# Patient Record
Sex: MALE | Race: WHITE | NOT HISPANIC OR LATINO | URBAN - METROPOLITAN AREA
[De-identification: names, ages, dates, MRNs, and addresses within clinical notes are randomized per-mention and may not be internally consistent; named-entity substitution may affect disease eponyms.]

---

## 2017-05-08 ENCOUNTER — IMPORTED ENCOUNTER (OUTPATIENT)
Dept: URBAN - METROPOLITAN AREA CLINIC 50 | Facility: CLINIC | Age: 82
End: 2017-05-08

## 2017-05-15 ENCOUNTER — IMPORTED ENCOUNTER (OUTPATIENT)
Dept: URBAN - METROPOLITAN AREA CLINIC 50 | Facility: CLINIC | Age: 82
End: 2017-05-15

## 2017-11-13 ENCOUNTER — IMPORTED ENCOUNTER (OUTPATIENT)
Dept: URBAN - METROPOLITAN AREA CLINIC 50 | Facility: CLINIC | Age: 82
End: 2017-11-13

## 2018-04-30 ENCOUNTER — IMPORTED ENCOUNTER (OUTPATIENT)
Dept: URBAN - METROPOLITAN AREA CLINIC 50 | Facility: CLINIC | Age: 83
End: 2018-04-30

## 2018-06-04 ENCOUNTER — IMPORTED ENCOUNTER (OUTPATIENT)
Dept: URBAN - METROPOLITAN AREA CLINIC 50 | Facility: CLINIC | Age: 83
End: 2018-06-04

## 2018-12-31 ENCOUNTER — IMPORTED ENCOUNTER (OUTPATIENT)
Dept: URBAN - METROPOLITAN AREA CLINIC 50 | Facility: CLINIC | Age: 83
End: 2018-12-31

## 2019-01-08 ENCOUNTER — IMPORTED ENCOUNTER (OUTPATIENT)
Dept: URBAN - METROPOLITAN AREA CLINIC 50 | Facility: CLINIC | Age: 84
End: 2019-01-08

## 2019-01-25 NOTE — PATIENT DISCUSSION
Very, very early dry AMD. AREDS 2 not recommended at this time. Will continue to watch. Advised to call with any vision changes.

## 2019-01-25 NOTE — PATIENT DISCUSSION
Pt edu as an aging change the vitreous starts to liquefy and pull away from the retina, usually it pulls away with out a problem. But sometimes the vitreous can stick to the retina which causes the macula to stretch and form a hole. Edu pt only treatment is with surgery to close the whole. Without surgery hole will not close. R & B of PPv (25g) MP w/Hesham, ILM rem with ICG, AFGx discussed in detail. Edu face down x 3 days after surgery, at least 45/15. Discussed, eye shield and vision always worse following surgery due to gas bubble. Stressed no flying until bubble 100% gone, most VA improvement is with in the first 2 months after the bubble dissipates but can take 6m-1y to reach full VA potential. May always have some distortion in central vision after surgery. Pt elects to proceed. Denies any recent cardiac procedures in the past 6 months. Informational brochures given to patient along with [employer]. Pt states she had neck surgery about 6 months ago, informed pt the hole will have a greater potential for closure if face down. STRESSED no flat on back.

## 2019-01-25 NOTE — PATIENT DISCUSSION
Watching vs surgery discussed. Recommend watching for now. VA is to good at this point. Could spontaneously resolve on own. We reviewed the signs and symptoms of retinal tear/retinal detachment and the importance of prompt evaluation should there be increasing floaters, new flashing lights, or decreasing peripheral vision in either eye at any time.

## 2019-02-07 NOTE — PATIENT DISCUSSION
Post-op instructions given. Discussed drops, positioning (facedown for 3 more nights) NO FLAT ON BACK, no strenuous activity and eye protection. No lifting over 40 lbs.  No swimming for 1 week.  No makeup for 1 week.  Call immediately if eye pain or loss of vision.  Green bracelet put on.  No flying with gas bubble.  Green bracelet will be removed once gas bubble is gone.

## 2019-02-14 NOTE — PATIENT DISCUSSION
Edu patient regarding progression of Cataracts following PPv. Will discuss cataract surgery at next visit.

## 2019-02-14 NOTE — PATIENT DISCUSSION
Post-op instructions given. Discussed drops, positioning, still no flat on back until bubble 100% gone. Continue to wear green bracelet until bubble 100% gone. Hold off on any dental procedure for now, ok to see OBGYN. Pt would like to go to a conference in [address] in April, will just need a bubble check prior to flying.

## 2019-03-05 NOTE — PATIENT DISCUSSION
Doing well, retina attached, good IOP with no signs of endophthalmitis. Bubble gone, [facility] 6m-1yr to reach full VA potential.  All restrictions lifted.

## 2019-05-24 NOTE — PATIENT DISCUSSION
Cataract is progressing following PPv, which was discussed prior to surgery. Recommend next available cataract consult with WERICL. Will need to be seen by JESTEVAN 1 month following cataract surgery.

## 2019-05-24 NOTE — PATIENT DISCUSSION
Doing well, retina attached, good IOP with no signs of endophthalmitis. Pt edu 6m-1yr to reach full VA potential and even then may still have some distortion. Healing well.

## 2019-06-04 NOTE — PATIENT DISCUSSION
**7/31/19 [facility] approved pt for cataract surgery. WJL reviewed JTM's exam notes and WJL's last exam. Pt is approved for [facility] OD. I called pt to notify her. Emailed Leslee to call pt. seymour.

## 2019-06-24 ENCOUNTER — IMPORTED ENCOUNTER (OUTPATIENT)
Dept: URBAN - METROPOLITAN AREA CLINIC 50 | Facility: CLINIC | Age: 84
End: 2019-06-24

## 2019-07-23 NOTE — PATIENT DISCUSSION
Stable from last visit.  Could spontaneously resolve on own. Cataract surgery could help pull the remaining off.  We reviewed the signs and symptoms of retinal tear/retinal detachment and the importance of prompt evaluation should there be increasing floaters, new flashing lights, or decreasing peripheral vision in either eye at any time.

## 2019-07-23 NOTE — PATIENT DISCUSSION
Doing well, retina attached, HOLE CLOSED.  Pt edu 6m-1yr to reach full VA potential and even then may still have some distortion.

## 2019-07-23 NOTE — PATIENT DISCUSSION
Stable from last visit.  Could spontaneously resolve on its own. Cataract surgery could help pull the remaining off. Will watch for now.  We reviewed the signs and symptoms of retinal tear/retinal detachment and the importance of prompt evaluation should there be increasing floaters, new flashing lights, or decreasing peripheral vision in either eye at any time.

## 2019-08-21 NOTE — PATIENT DISCUSSION
ok to proceed with IOL OD due to [address] dec for Sx OD made today with KMS Goal: CV ALTAGRACIA OD.  pt knows will NVO for inside arm's length.

## 2019-09-11 NOTE — PATIENT DISCUSSION
Recommend Mini lower lift, Deep plane lift, no platysmaplasty, post-tragel, SMAS to Cheeks(discussed risks and benefits of sx. ..). Full face laser not recommended.

## 2019-09-13 NOTE — PATIENT DISCUSSION
"see J next week for [facility] pt says just happened and ""popped"" this past Tuesday. "

## 2019-09-17 NOTE — PATIENT DISCUSSION
Pt edu like OS, OD now has a MH. Only treatment is surgery. Edu still very early [address] and still some traction. Would recommend PPv (25g) MP w/Hesham, ILM rem w/ICG, AFGx (20% SF6), R & B discussed in detail. Pt elects to proceed. Stressed would need to remain face down x3 days after surgery for best results. [employer] given to patient to order chair. Pt denies any recent cardiac procedures in the past 6 months. Stressed most vision improvement within the first 2 months but can take 6m-1yr to reach full VA potential and may even still have some distortion following successful surgery.

## 2019-09-24 NOTE — PATIENT DISCUSSION
Patient informed of available non-opioid medications and other non-pharmacological options. Discussed advantages and disadvantages of the alternatives, including whether the patient is at-risk for, or has a history of, controlled substance abuse or misuse, and the patient’s personal preferences. [facility] “Opioid Alternative Pamphlet” was provided to patient.

## 2019-09-24 NOTE — PATIENT DISCUSSION
Recommend Mini lower lift, Deep plane lift, no platysmaplasty, post-tragel, SMAS to Cheeks(discussed risks and benefits of sx. ..). Add medium Chin Implant. No laser.

## 2019-09-25 NOTE — PATIENT DISCUSSION
Explained that poor vision is expected until the gas fill recedes beyond the intermediate point. Most vision improvement within the first 2 months but can take 6m-1yr to reach full vision potential. Advised pt to call with any vision changes, 24/7 ER reviewed.

## 2019-09-25 NOTE — PATIENT DISCUSSION
Post-op instructions given. Discussed drops, positioning, no strenuous activity and eye protection. Call immediately if eye pain or loss of vision. Stressed to patient 2 more days of face down position then any position but NO FLAT on back. Do not recommend make up for at least 1 week and recommend using a new mascara. Ok to return to work on Monday.

## 2019-09-26 NOTE — PATIENT DISCUSSION
Explained that poor vision is expected until the gas fill recedes beyond the half-way point. Most vision improvement within the first 2 months but can take 6m-1yr to reach full vision potential. Advised pt to call with any vision changes, 24/7 ER reviewed.

## 2019-10-03 NOTE — PATIENT DISCUSSION
Explained that poor vision is expected until the gas fill recedes beyond the detention point. Most vision improvement within the first 2 months but can take 6m-1yr to reach full vision potential. Advised pt to call with any vision changes, 24/7 ER reviewed.

## 2019-10-03 NOTE — PATIENT DISCUSSION
Post-op instructions given. Discussed drops, positioning, no strenuous activity and eye protection. Call immediately if eye pain or loss of vision. Any position ok but still no FLAT ON BACK. Explained to patient that the floaters she is seeing is the gas bubble breaking up. Still no flying until bubble 100% gone. Do not recommend anything that jars the head or heavy lifting. Will clear patient for procedure with Dr. Antione Beltran at next visit, bubble needs to be gone before proceeding.

## 2019-10-17 NOTE — PATIENT DISCUSSION
Most vision improvement within the first 2 months but can take 6m-1yr to reach full vision potential. Advised pt to call with any vision changes, 24/7 ER reviewed.

## 2019-10-17 NOTE — PATIENT DISCUSSION
Post-op instructions given. Discussed drops, positioning, no strenuous activity and eye protection. Call immediately if eye pain or loss of vision. Bubble is gone so pt cleared for surgery with JPF.

## 2019-10-21 NOTE — PATIENT DISCUSSION
Patient informed of available non-opioid medications and other non-pharmacological options. Discussed advantages and disadvantages of the alternatives, including whether the patient is at-risk for, or has a history of, controlled substance abuse or misuse, and the patient’s personal preferences. [address] [facility] “Opioid Alternative Pamphlet” was provided to patient.

## 2019-10-21 NOTE — PATIENT DISCUSSION
Patient informed of available non-opioid medications and other non-pharmacological options. Discussed advantages and disadvantages of the alternatives, including whether the patient is at-risk for, or has a history of, controlled substance abuse or misuse, and the patient’s personal preferences. [facility] “Opioid Alternative Pamphlet” was provided to patient.

## 2019-10-21 NOTE — PROCEDURE NOTE: SURGICAL
MR #:&nbsp; [id number]<LY />  <br />  PREOPERATIVE DIAGNOSIS:&nbsp; 1. Lower lid fat herniation and excess skin<br />  <br />  POSTOPERATIVE DIAGNOSIS: &nbsp; &nbsp;Same <br />  <br />  PROCEDURE: &nbsp; 1. C02 Laser transconjunctival blepharoplasty lower lids; 2. Skin resurfacing of lower lids<br />  <br />  ANESTHESIA: &nbsp; &nbsp; Local MAC<br />  <br />  ESTIMATED BLOOD LOSS: &nbsp; &nbsp;Minimal, &lt;5 cc <br />  <br />  COMPLICATIONS: &nbsp; &nbsp;None<br />  <br />  INDICATION:&nbsp; This patient has large herniated fat on the lower lids and redundant skin of the lower lids. We decided to perform a lower blepharoplasty operation to remove lower lid fat bags and to lightly resurface the skin to tighten them up. Patient understands the risks and benefits of the surgery and wishes to proceed. <br />  <br />  <br />  PROCEDURE: &nbsp; The patient was brought to the operating room and laid in the supine position. Prior to surgery, a time-out was performed in the operating room, and the nurse confirmed the patient’s identity and name, the side and site of the surgery and the type of surgery and procedure to be performed. I proceeded with the marking of the patient with a blue marking pen, in the areas of surgery to be performed. The patient was then sedated and injected with Xylocaine 2% with epinephrine. &nbsp; Approximately 6ccs was used for both lids. &nbsp; A drop of Alcaine was placed over both eyes. The patient was then prepped and draped in typical fashion for facial plastic surgery. Laser safe protective corneal lenses were then placed over both eyes. Attention was turned to the lower blepharoplasty operation where a rake was then inserted into the right lower lid, retracting it downward. A transconjunctival incision was made 2mm below the tarsus with the Bovie cautery. This incision was carried across the extent of the inner lid. Dissection was carried down with the Bovie cautery while pulling upward on the conjunctiva and retractor layer with a forceps. The fat was released from the septum and allowed to herniate forward exposing the nasal, central and temporal fat pads. Care was taken to avoid the inferior oblique muscle. These pads were teased forward and isolated. All bleeders were cauterized with Bovie cautery. The fat was removed selectively with Bi-polar scissors. Hemostasis was further achieved with a Bovie cautery. After judging adequate fat removal, the left lower lid had similar procedure. The skin was then resurfaced using the resurfacing CO2 laser on a low setting of 16 serrano scan mode for an initial pass on both lower lids. This was done to tighten up the skin to remove wrinkles. This patient did have lid laxity so the laser was not aggressively performed on this patient in order to avoid ectropion. A second pass on the malar area was performed with the C02 laser set again at 16 serrano. This was done to tighten up the malar area so fluid bags would not collect on the cheeks of this patient. At the end of this procedure the laser lens were removed. The patient was cleaned; antibiotic salve was placed on the wounds. The patient was sent to recovery room in stable condition. <br />

## 2019-10-22 NOTE — PATIENT DISCUSSION
Patient informed of available non-opioid medications and other non-pharmacological options. Discussed advantages and disadvantages of the alternatives, including whether the patient is at-risk for, or has a history of, controlled substance abuse or misuse, and the patient’s personal preferences. [facility] “Opioid Alternative Pamphlet” was provided to patient.

## 2019-10-22 NOTE — PATIENT DISCUSSION
One day post op: lids in good position, healing well, no infection, use erythromycin TID to lower eyelids, use Tobradex TID , use cold packs, sleep at a 30 degree angle. Wear sunglasses and hat while outdoors. Avoid sun exposure.

## 2019-10-28 NOTE — PATIENT DISCUSSION
Patient informed of available non-opioid medications and other non-pharmacological options. Discussed advantages and disadvantages of the alternatives, including whether the patient is at-risk for, or has a history of, controlled substance abuse or misuse, and the patient’s personal preferences. [facility] “Opioid Alternative Pamphlet” was provided to patient.

## 2019-11-04 NOTE — PATIENT DISCUSSION
Patient informed of available non-opioid medications and other non-pharmacological options. Discussed advantages and disadvantages of the alternatives, including whether the patient is at-risk for, or has a history of, controlled substance abuse or misuse, and the patient’s personal preferences. [facility] “Opioid Alternative Pamphlet” was provided to patient.

## 2019-11-04 NOTE — PATIENT DISCUSSION
Two week post op: great curve and shape, no infection, healing well, sutures intact and removed, use erythromycin QHS to upper eyelids x 7-10 days. Use Vitamin E oil/Skinuva QHS x 1 month to incisions after 10 days. Wear sunglasses and hat while outdoors. Avoid sun exposure. No restrictions in 5 days.

## 2019-11-05 NOTE — PATIENT DISCUSSION
Some PO swelling, normal following surgery. Recommend starting BromSite 1 gtt Qday until gone, sample given. Will follow up in 1 month.

## 2019-11-18 NOTE — PATIENT DISCUSSION
Patient informed of available non-opioid medications and other non-pharmacological options. Discussed advantages and disadvantages of the alternatives, including whether the patient is at-risk for, or has a history of, controlled substance abuse or misuse, and the patient’s personal preferences. [address] [facility] “Opioid Alternative Pamphlet” was provided to patient.

## 2019-11-18 NOTE — PATIENT DISCUSSION
Four week post op: great curve and shape, no infection, healing well,  Use Skinuva QHS x 1 month. Wear sunglasses and hat while outdoors. Avoid sun exposure.

## 2019-12-03 ENCOUNTER — IMPORTED ENCOUNTER (OUTPATIENT)
Dept: URBAN - METROPOLITAN AREA CLINIC 50 | Facility: CLINIC | Age: 84
End: 2019-12-03

## 2019-12-09 ENCOUNTER — IMPORTED ENCOUNTER (OUTPATIENT)
Dept: URBAN - METROPOLITAN AREA CLINIC 50 | Facility: CLINIC | Age: 84
End: 2019-12-09

## 2020-06-15 ENCOUNTER — IMPORTED ENCOUNTER (OUTPATIENT)
Dept: URBAN - METROPOLITAN AREA CLINIC 50 | Facility: CLINIC | Age: 85
End: 2020-06-15

## 2021-01-12 NOTE — PATIENT DISCUSSION
Hole closed. I will START or STAY ON the medications listed below when I get home from the hospital:    aspirin 81 mg oral tablet, chewable  -- 1 tab(s) by mouth once a day  -- Indication: For -    fosinopril 10 mg oral tablet  -- 1 tab(s) by mouth once a day  -- Indication: For -    metFORMIN 500 mg oral tablet, extended release  -- 2 tab(s) by mouth 2 times a day  -- Indication: For -    Jardiance 25 mg oral tablet  -- 1 tab(s) by mouth once a day (in the morning)  -- Indication: For -    Trulicity Pen 1.5 mg/0.5 mL subcutaneous solution  -- 0.5 milliliter(s) subcutaneous once a week      ***Mondays****  -- Indication: For -    nateglinide 120 mg oral tablet  -- 1 tab(s) by mouth 3 times a day (before meals)  -- Indication: For -    Toujeo SoloStar 300 units/mL subcutaneous solution  -- 45 unit(s) subcutaneous once a day (at bedtime)  -- Indication: For -    atorvastatin 20 mg oral tablet  -- 1 tab(s) by mouth once a day (at bedtime)  -- Indication: For -    fenofibrate 160 mg oral tablet  -- 1 tab(s) by mouth once a day (at bedtime)  -- Indication: For -    clopidogrel 75 mg oral tablet  -- 1 tab(s) by mouth once a day  -- Indication: For -    metoprolol succinate 50 mg oral tablet, extended release  -- 1 tab(s) by mouth once a day  -- Indication: For -    Flonase 50 mcg/inh nasal spray  -- 1 spray(s) in each affected nostril once a day, As Needed - for allergy symptoms  -- Indication: For -    Vitamin D2 50,000 intl units (1.25 mg) oral capsule  -- 1 cap(s) by mouth once a week    ***Mondays****  -- Indication: For -

## 2021-02-02 NOTE — PATIENT DISCUSSION
skinuva. Chin implant in place.  good position. May need 5-FU injection at next visit. Return 6 weeks.

## 2021-03-23 NOTE — PATIENT DISCUSSION
No CME, resolved. No treatment recommended at this time. Will continue to monitor. Advised pt to call with any vision changes.

## 2021-04-18 ASSESSMENT — VISUAL ACUITY
OD_CC: J1
OS_CC: J1@ 16 IN
OD_SC: 20/25-1
OS_SC: 20/25-2
OS_CC: J1+
OD_BAT: 20/50
OD_CC: J1@ 16 IN
OD_SC: 20/25
OS_SC: 20/25
OS_BAT: 20/50
OS_OTHER: 20/100. >20/400.
OS_PH: 20/25-2
OS_CC: J1
OD_OTHER: 20/50. 20/70.
OS_BAT: 20/50
OD_SC: 20/25
OS_OTHER: 20/50. 20/70.
OD_CC: J1
OS_CC: J2
OD_CC: J1+
OD_SC: 20/25
OS_CC: J1+
OS_BAT: 20/100
OS_OTHER: 20/50. 20/100.
OS_SC: 20/30
OS_SC: 20/25
OS_SC: 20/30
OD_SC: 20/25
OS_CC: J1
OD_SC: 20/25
OS_SC: 20/30+1
OS_SC: 20/25-1
OD_CC: J1+
OS_SC: 20/30
OD_SC: 20/20
OD_SC: 20/20
OD_CC: J2

## 2021-04-18 ASSESSMENT — TONOMETRY
OS_IOP_MMHG: 14
OS_IOP_MMHG: 16
OD_IOP_MMHG: 15
OD_IOP_MMHG: 11
OD_IOP_MMHG: 13
OD_IOP_MMHG: 16
OS_IOP_MMHG: 14
OS_IOP_MMHG: 15
OS_IOP_MMHG: 15
OS_IOP_MMHG: 13
OD_IOP_MMHG: 14
OD_IOP_MMHG: 13
OS_IOP_MMHG: 14
OD_IOP_MMHG: 16
OS_IOP_MMHG: 15

## 2023-01-24 NOTE — PATIENT DISCUSSION
1/24/2023:  Despite some risk factors, the patient does not demonstrate definitive evidence of glaucoma at this time.   get HVF due to focal RNFL loss OS noted on Optos red-free today.

## 2023-06-18 NOTE — PATIENT DISCUSSION
Stable from last visit.  Could spontaneously resolve on own. Cataract surgery could help pull the remaining off.  We reviewed the signs and symptoms of retinal tear/retinal detachment and the importance of prompt evaluation should there be increasing floaters, new flashing lights, or decreasing peripheral vision in either eye at any time. syncope

## 2023-10-25 NOTE — PATIENT DISCUSSION
Recommend chin implant, medium extended (discussed risks and benefits of sx. .. ). UA collected and sent to lab.

## 2024-09-03 NOTE — PATIENT DISCUSSION
"Subjective   Patient ID: Valentín Wang is a 31 y.o. male who presents for lengthy video conference visit med management plus supportive therapy.Virtual or Telephone Consent    An interactive audio and video telecommunication system which permits real time communications between the patient (at the originating site) and provider (at the distant site) was utilized to provide this telehealth service.   Verbal consent was requested and obtained from Valentín Wang on this date, 24 for a telehealth visit.    HPI patient seen mother present as well.  Patient is really been struggling with depression.  He thinks a lot of it was triggered by upsetting interactions with father but also with the anticipation of the father coming to visit in October.  Patient has actually had suicidal thoughts a few times.  Patient is caught in a situation where in order to be able to benefit from the trust left by his grandfather, he needs to \"play ball\" with his father who took over the trust management after grandfather .  Father very unsupportive of what patient is trying to do, and father insisted that he go on disability.  Mother is concerned about Darell's ability to care for himself if she is not able to.  This includes the fact that he has not been driving and is pretty much a \"shut in\".  Mother equally feels trapped, indicating that she could never support herself and Darell without her ex-'s help.  Discussed medication options including ways to hopefully be able to help patient not have such a hair trigger response to fathers triggers.    Review of Systems   Psychiatric/Behavioral:  Positive for dysphoric mood and suicidal ideas. Negative for behavioral problems, decreased concentration, self-injury and sleep disturbance. The patient is nervous/anxious.        Objective   Physical Exam  Psychiatric:         Attention and Perception: Attention and perception normal.         Mood and Affect: Mood is anxious and " Recommended observation. depressed. Affect is blunt.         Speech: Speech normal.         Behavior: Behavior is withdrawn. Behavior is cooperative.         Thought Content: Thought content is not paranoid or delusional. Thought content includes suicidal ideation. Thought content does not include suicidal plan.         Cognition and Memory: Cognition and memory normal.         Judgment: Judgment normal.      Comments: Passive suicidal thoughts when triggered by father.  No active plans         Assessment/Plan   Problem List Items Addressed This Visit             ICD-10-CM    ADD (attention deficit disorder) F98.8    Relevant Medications    lisdexamfetamine (Vyvanse) 60 mg capsule    lisdexamfetamine (Vyvanse) 60 mg capsule (Start on 10/3/2024)    lisdexamfetamine (Vyvanse) 60 mg capsule (Start on 11/2/2024)    Depression F32.A     Trial increase Cymbalta to 120 mg daily.  The goal is not to fix the situation with his father but rather to help patient have more emotional resilience in the face of triggering events.  However given the fact that interaction of the father are globally triggering and he is not amenable to change, gave patient permission to not be around when father visits in October.  He seemed grateful for this advice.  Follow-up 3 months         Relevant Medications    DULoxetine (Cymbalta) 60 mg DR capsule    clonazePAM (KlonoPIN) 0.5 mg tablet            Curtis Fernandes MD 09/03/24 4:51 PM
